# Patient Record
Sex: FEMALE | Race: WHITE | NOT HISPANIC OR LATINO | URBAN - METROPOLITAN AREA
[De-identification: names, ages, dates, MRNs, and addresses within clinical notes are randomized per-mention and may not be internally consistent; named-entity substitution may affect disease eponyms.]

---

## 2018-02-19 ENCOUNTER — APPOINTMENT (RX ONLY)
Dept: URBAN - METROPOLITAN AREA CLINIC 24 | Facility: CLINIC | Age: 53
Setting detail: DERMATOLOGY
End: 2018-02-19

## 2018-02-19 DIAGNOSIS — Z41.9 ENCOUNTER FOR PROCEDURE FOR PURPOSES OTHER THAN REMEDYING HEALTH STATE, UNSPECIFIED: ICD-10-CM

## 2018-02-19 PROCEDURE — ? GENTLELASE

## 2018-02-19 ASSESSMENT — LOCATION ZONE DERM
LOCATION ZONE: LIP
LOCATION ZONE: ARM
LOCATION ZONE: FACE
LOCATION ZONE: TRUNK

## 2018-02-19 ASSESSMENT — LOCATION DETAILED DESCRIPTION DERM
LOCATION DETAILED: RIGHT ANTERIOR SHOULDER
LOCATION DETAILED: LEFT CHIN
LOCATION DETAILED: LEFT SUPERIOR VERMILION LIP
LOCATION DETAILED: LEFT LATERAL SUPERIOR CHEST

## 2018-02-19 ASSESSMENT — LOCATION SIMPLE DESCRIPTION DERM
LOCATION SIMPLE: CHEST
LOCATION SIMPLE: CHIN
LOCATION SIMPLE: LEFT LIP
LOCATION SIMPLE: RIGHT SHOULDER

## 2018-02-19 NOTE — PROCEDURE: GENTLELASE
Spot Size: 18 mm
Pulse Duration: 3 ms
Cooling: DCD 50/50
Indication: Laser Hair Removal
Consent: Written consent obtained, risks reviewed including but not limited to crusting, scabbing, blistering, scarring, darker or lighter pigmentary change, paradoxical hair regrowth, incomplete removal of hair and infection.
Endpoint: Lentigines: Immediate endpoint: perilesional erythema and edema. Vaseline and ice applied. Post care reviewed with patient.
Endpoint: Laser Hair Removal: Immediate endpoint: perifollicular erythema and edema. Vaseline and ice applied. Post care reviewed with patient.
Post-Care Instructions: I reviewed with the patient in detail post-care instructions. Patient should avoid sun for a minimum of 4 weeks before and after treatment.
External Cooling: Gera Cryo 6
Detail Level: Detailed
Fluence: 10
Laser Type: Alexandrite 755nm
Price (Use Numbers Only, No Special Characters Or $): 25.00
External Cooling Fan Speed: 1
Total Pulses: 128

## 2018-05-14 ENCOUNTER — APPOINTMENT (RX ONLY)
Dept: URBAN - METROPOLITAN AREA CLINIC 24 | Facility: CLINIC | Age: 53
Setting detail: DERMATOLOGY
End: 2018-05-14

## 2018-05-14 DIAGNOSIS — Z41.9 ENCOUNTER FOR PROCEDURE FOR PURPOSES OTHER THAN REMEDYING HEALTH STATE, UNSPECIFIED: ICD-10-CM

## 2018-05-14 PROCEDURE — ? GENTLELASE

## 2018-05-14 ASSESSMENT — LOCATION DETAILED DESCRIPTION DERM
LOCATION DETAILED: RIGHT UPPER CUTANEOUS LIP
LOCATION DETAILED: PHILTRUM
LOCATION DETAILED: LEFT UPPER CUTANEOUS LIP

## 2018-05-14 ASSESSMENT — LOCATION ZONE DERM: LOCATION ZONE: LIP

## 2018-05-14 ASSESSMENT — LOCATION SIMPLE DESCRIPTION DERM
LOCATION SIMPLE: LEFT LIP
LOCATION SIMPLE: UPPER LIP
LOCATION SIMPLE: RIGHT LIP

## 2018-05-14 NOTE — PROCEDURE: GENTLELASE
Endpoint: Lentigines: Immediate endpoint: perilesional erythema and edema. Vaseline and ice applied. Post care reviewed with patient.
Fluence: 14
External Cooling: Gera Cryo 6
Post-Care Instructions: I reviewed with the patient in detail post-care instructions. Patient should avoid sun for a minimum of 4 weeks before and after treatment.
External Cooling Fan Speed: 1
Consent: Written consent obtained, risks reviewed including but not limited to crusting, scabbing, blistering, scarring, darker or lighter pigmentary change, paradoxical hair regrowth, incomplete removal of hair and infection.
Indication: Laser Hair Removal
Pulse Duration: 3 ms
Endpoint: Laser Hair Removal: Immediate endpoint: perifollicular erythema and edema. Vaseline and ice applied. Post care reviewed with patient.
Cooling: DCD 50/50
Detail Level: Detailed
Total Pulses: 5
Spot Size: 18 mm
Laser Type: Alexandrite 755nm

## 2019-03-14 ENCOUNTER — APPOINTMENT (RX ONLY)
Dept: URBAN - METROPOLITAN AREA CLINIC 23 | Facility: CLINIC | Age: 54
Setting detail: DERMATOLOGY
End: 2019-03-14

## 2019-03-14 DIAGNOSIS — Z41.9 ENCOUNTER FOR PROCEDURE FOR PURPOSES OTHER THAN REMEDYING HEALTH STATE, UNSPECIFIED: ICD-10-CM

## 2019-03-14 PROCEDURE — ? GENTLELASE

## 2019-03-14 ASSESSMENT — LOCATION SIMPLE DESCRIPTION DERM
LOCATION SIMPLE: LEFT POSTERIOR THIGH
LOCATION SIMPLE: RIGHT KNEE
LOCATION SIMPLE: RIGHT THIGH
LOCATION SIMPLE: RIGHT BREAST
LOCATION SIMPLE: CHEST
LOCATION SIMPLE: LEFT THIGH
LOCATION SIMPLE: RIGHT POSTERIOR THIGH
LOCATION SIMPLE: LEFT KNEE
LOCATION SIMPLE: LEFT LIP

## 2019-03-14 ASSESSMENT — LOCATION DETAILED DESCRIPTION DERM
LOCATION DETAILED: LEFT DISTAL POSTERIOR THIGH
LOCATION DETAILED: RIGHT AXILLARY TAIL OF BREAST
LOCATION DETAILED: LEFT LATERAL SUPERIOR CHEST
LOCATION DETAILED: LEFT ANTERIOR PROXIMAL THIGH
LOCATION DETAILED: LEFT KNEE
LOCATION DETAILED: LEFT UPPER CUTANEOUS LIP
LOCATION DETAILED: RIGHT KNEE
LOCATION DETAILED: RIGHT DISTAL POSTERIOR THIGH
LOCATION DETAILED: RIGHT ANTERIOR PROXIMAL THIGH
LOCATION DETAILED: LEFT LOWER CUTANEOUS LIP

## 2019-03-14 ASSESSMENT — LOCATION ZONE DERM
LOCATION ZONE: TRUNK
LOCATION ZONE: LEG
LOCATION ZONE: LIP

## 2019-03-14 NOTE — PROCEDURE: GENTLELASE
Price (Use Numbers Only, No Special Characters Or $): 25
Consent: Written consent obtained, risks reviewed including but not limited to crusting, scabbing, blistering, scarring, darker or lighter pigmentary change, paradoxical hair regrowth, incomplete removal of hair and infection.
Cooling: DCD setting
External Cooling Fan Speed: 0
Endpoint: Seborrheic Keratoses: Immediate endpoint: perilesional erythema and edema. Vaseline and ice applied. Post care reviewed with patient.
Fluence: 15
Endpoint: Laser Hair Removal: Immediate endpoint: perifollicular erythema and edema. Vaseline and ice applied. Post care reviewed with patient.
Spot Size: 18 mm
Laser Type: Alexandrite 755nm
Pulse Duration: 3 ms
Detail Level: Detailed
Indication: Laser Hair Removal
Post-Care Instructions: I reviewed with the patient in detail post-care instructions. Patient should avoid sun for a minimum of 4 weeks before and after treatment.

## 2019-05-06 ENCOUNTER — APPOINTMENT (RX ONLY)
Dept: URBAN - METROPOLITAN AREA CLINIC 23 | Facility: CLINIC | Age: 54
Setting detail: DERMATOLOGY
End: 2019-05-06

## 2019-05-06 DIAGNOSIS — Z41.9 ENCOUNTER FOR PROCEDURE FOR PURPOSES OTHER THAN REMEDYING HEALTH STATE, UNSPECIFIED: ICD-10-CM

## 2019-05-06 PROCEDURE — ? GENTLEMAX

## 2019-05-06 PROCEDURE — ? GENTLELASE

## 2019-05-06 ASSESSMENT — LOCATION SIMPLE DESCRIPTION DERM
LOCATION SIMPLE: CHIN
LOCATION SIMPLE: RIGHT HAND
LOCATION SIMPLE: LEFT HAND

## 2019-05-06 ASSESSMENT — LOCATION DETAILED DESCRIPTION DERM
LOCATION DETAILED: RIGHT CHIN
LOCATION DETAILED: LEFT RADIAL DORSAL HAND
LOCATION DETAILED: RIGHT ULNAR DORSAL HAND

## 2019-05-06 ASSESSMENT — LOCATION ZONE DERM
LOCATION ZONE: FACE
LOCATION ZONE: HAND

## 2019-05-06 NOTE — PROCEDURE: GENTLEMAX
Cooling: DCD 40/30
Pulse Duration: 1 ms
External Cooling Fan Speed: 0
Total Pulses: 150
Consent: Written consent obtained, risks reviewed including but not limited to crusting, scabbing, blistering, scarring, darker or lighter pigmentary change, paradoxical hair regrowth, incomplete removal of hair and infection.
Fluence: 14
Endpoint: Immediate endpoint: perifollicular erythema and edema. Vaseline and ice applied. Post care reviewed with patient.\\n\\nPatient was too tan to IPL lower arms and chest so ONLY DID HANDS. Did test spots on chest and still looks too tan.
Post-Care Instructions: I reviewed with the patient in detail post-care instructions. Patient should avoid sun for a minimum of 4 weeks before and after treatment.
Detail Level: Detailed
Spot Size: 15 mm

## 2019-05-06 NOTE — PROCEDURE: GENTLELASE
Cooling: DCD setting
Endpoint: Seborrheic Keratoses: Immediate endpoint: perilesional erythema and edema. Vaseline and ice applied. Post care reviewed with patient.
Detail Level: Detailed
Fluence: 12
Indication: Laser Hair Removal
Price (Use Numbers Only, No Special Characters Or $): 25
Consent: Written consent obtained, risks reviewed including but not limited to crusting, scabbing, blistering, scarring, darker or lighter pigmentary change, paradoxical hair regrowth, incomplete removal of hair and infection.
Post-Care Instructions: I reviewed with the patient in detail post-care instructions. Patient should avoid sun for a minimum of 4 weeks before and after treatment.
Endpoint: Laser Hair Removal: Immediate endpoint: perifollicular erythema and edema. Vaseline and ice applied. Post care reviewed with patient.\\n\\n1 more upper leg & 3 more Bikinix,4 forehead\\nT/U on Lower leg, chin/lip, Ax $75 T/U
Spot Size: 18 mm
External Cooling Fan Speed: 0
Pulse Duration: 3 ms
Laser Type: Alexandrite 755nm

## 2020-02-14 ENCOUNTER — APPOINTMENT (RX ONLY)
Dept: URBAN - METROPOLITAN AREA CLINIC 23 | Facility: CLINIC | Age: 55
Setting detail: DERMATOLOGY
End: 2020-02-14

## 2020-02-14 DIAGNOSIS — Z41.9 ENCOUNTER FOR PROCEDURE FOR PURPOSES OTHER THAN REMEDYING HEALTH STATE, UNSPECIFIED: ICD-10-CM

## 2020-02-14 PROCEDURE — ? GENTLELASE

## 2020-02-14 PROCEDURE — ? GENTLEMAX

## 2020-02-14 ASSESSMENT — LOCATION ZONE DERM
LOCATION ZONE: ARM
LOCATION ZONE: HAND
LOCATION ZONE: TRUNK
LOCATION ZONE: FACE
LOCATION ZONE: NECK

## 2020-02-14 ASSESSMENT — LOCATION DETAILED DESCRIPTION DERM
LOCATION DETAILED: RIGHT AXILLARY TAIL OF BREAST
LOCATION DETAILED: RIGHT SUPERIOR ANTERIOR NECK
LOCATION DETAILED: RIGHT INFERIOR CENTRAL MALAR CHEEK
LOCATION DETAILED: RIGHT CENTRAL BUCCAL CHEEK
LOCATION DETAILED: LEFT SUPERIOR ANTERIOR NECK
LOCATION DETAILED: LEFT SUPERIOR LATERAL BUCCAL CHEEK
LOCATION DETAILED: RIGHT ULNAR DORSAL HAND
LOCATION DETAILED: LEFT RADIAL DORSAL HAND
LOCATION DETAILED: RIGHT CHIN
LOCATION DETAILED: RIGHT INFERIOR LATERAL MALAR CHEEK
LOCATION DETAILED: LEFT INFERIOR CENTRAL MALAR CHEEK
LOCATION DETAILED: LEFT INFERIOR MEDIAL BUCCAL CHEEK
LOCATION DETAILED: LEFT ANTERIOR MEDIAL PROXIMAL UPPER ARM

## 2020-02-14 ASSESSMENT — LOCATION SIMPLE DESCRIPTION DERM
LOCATION SIMPLE: CHIN
LOCATION SIMPLE: RIGHT ANTERIOR NECK
LOCATION SIMPLE: LEFT CHEEK
LOCATION SIMPLE: LEFT HAND
LOCATION SIMPLE: RIGHT HAND
LOCATION SIMPLE: RIGHT BREAST
LOCATION SIMPLE: LEFT ANTERIOR NECK
LOCATION SIMPLE: LEFT UPPER ARM
LOCATION SIMPLE: RIGHT CHEEK

## 2020-02-14 NOTE — PROCEDURE: GENTLELASE
Endpoint: Seborrheic Keratoses: Immediate endpoint: perilesional erythema and edema. Vaseline and ice applied. Post care reviewed with patient.
Spot Size: 18 mm
Consent: Written consent obtained, risks reviewed including but not limited to crusting, scabbing, blistering, scarring, darker or lighter pigmentary change, paradoxical hair regrowth, incomplete removal of hair and infection.
Price (Use Numbers Only, No Special Characters Or $): 25
Endpoint: Laser Hair Removal: Immediate endpoint: perifollicular erythema and edema. Vaseline and ice applied. Post care reviewed with patient.\\n\\n
External Cooling Fan Speed: 0
Cooling: DCD setting
Total Pulses: 500
Indication: Laser Hair Removal
Laser Type: Alexandrite 755nm
Pulse Duration: 5 ms
Fluence: 16
Detail Level: Detailed
Post-Care Instructions: I reviewed with the patient in detail post-care instructions. Patient should avoid sun for a minimum of 4 weeks before and after treatment.

## 2020-02-14 NOTE — PROCEDURE: GENTLEMAX
Spot Size: 18 mm
Post-Care Instructions: I reviewed with the patient in detail post-care instructions. Patient should avoid sun for a minimum of 4 weeks before and after treatment.
Detail Level: Detailed
Price (Use Numbers Only, No Special Characters Or $): 25
External Cooling Fan Speed: 0
Endpoint: Immediate endpoint: perifollicular erythema and edema. Vaseline and ice applied. Post care reviewed with patient\\n\\nbrowns
Pulse Duration: 5 ms
Consent: Written consent obtained, risks reviewed including but not limited to crusting, scabbing, blistering, scarring, darker or lighter pigmentary change, paradoxical hair regrowth, incomplete removal of hair and infection.
Fluence: 16
Cooling: DCD 0

## 2020-10-13 ENCOUNTER — APPOINTMENT (RX ONLY)
Dept: URBAN - METROPOLITAN AREA CLINIC 23 | Facility: CLINIC | Age: 55
Setting detail: DERMATOLOGY
End: 2020-10-13

## 2020-10-13 DIAGNOSIS — Z41.9 ENCOUNTER FOR PROCEDURE FOR PURPOSES OTHER THAN REMEDYING HEALTH STATE, UNSPECIFIED: ICD-10-CM

## 2020-10-13 PROCEDURE — ? GENTLELASE

## 2020-10-13 ASSESSMENT — LOCATION ZONE DERM
LOCATION ZONE: LIP
LOCATION ZONE: ARM
LOCATION ZONE: FACE

## 2020-10-13 ASSESSMENT — LOCATION DETAILED DESCRIPTION DERM
LOCATION DETAILED: LEFT CHIN
LOCATION DETAILED: LEFT ANTERIOR MEDIAL PROXIMAL UPPER ARM
LOCATION DETAILED: RIGHT CHIN
LOCATION DETAILED: LEFT INFERIOR LATERAL MALAR CHEEK
LOCATION DETAILED: RIGHT LATERAL BUCCAL CHEEK
LOCATION DETAILED: LEFT UPPER CUTANEOUS LIP
LOCATION DETAILED: RIGHT UPPER CUTANEOUS LIP
LOCATION DETAILED: LEFT LATERAL BUCCAL CHEEK

## 2020-10-13 ASSESSMENT — LOCATION SIMPLE DESCRIPTION DERM
LOCATION SIMPLE: CHIN
LOCATION SIMPLE: RIGHT CHEEK
LOCATION SIMPLE: LEFT LIP
LOCATION SIMPLE: RIGHT LIP
LOCATION SIMPLE: LEFT UPPER ARM
LOCATION SIMPLE: LEFT CHEEK

## 2020-10-13 NOTE — HPI: COSMETIC (LASER HAIR REMOVAL)
Additional History: t/u browns and vessels next time\\ngave samples of anteage to try!\\aki told her about sepcial

## 2020-10-13 NOTE — PROCEDURE: GENTLELASE
Post-Care Instructions: I reviewed with the patient in detail post-care instructions. Patient should avoid sun for a minimum of 4 weeks before and after treatment.
Total Pulses: 15
Endpoint: Seborrheic Keratoses: Immediate endpoint: perilesional erythema and edema. Vaseline and ice applied. Post care reviewed with patient.
Endpoint: Laser Hair Removal: Immediate endpoint: perifollicular erythema and edema. Vaseline and ice applied. Post care reviewed with patient.\\n\\n
Spot Size: 18 mm
External Cooling Fan Speed: 0
Consent: Written consent obtained, risks reviewed including but not limited to crusting, scabbing, blistering, scarring, darker or lighter pigmentary change, paradoxical hair regrowth, incomplete removal of hair and infection.
Pulse Duration: 5 ms
Detail Level: Detailed
Cooling: DCD 30/20
Fluence: 14
Indication: Laser Hair Removal
Price (Use Numbers Only, No Special Characters Or $): 25
Laser Type: Alexandrite 755nm

## 2020-12-08 ENCOUNTER — APPOINTMENT (RX ONLY)
Dept: URBAN - METROPOLITAN AREA CLINIC 23 | Facility: CLINIC | Age: 55
Setting detail: DERMATOLOGY
End: 2020-12-08

## 2021-05-06 ENCOUNTER — APPOINTMENT (RX ONLY)
Dept: URBAN - METROPOLITAN AREA CLINIC 23 | Facility: CLINIC | Age: 56
Setting detail: DERMATOLOGY
End: 2021-05-06

## 2021-05-06 DIAGNOSIS — Z41.9 ENCOUNTER FOR PROCEDURE FOR PURPOSES OTHER THAN REMEDYING HEALTH STATE, UNSPECIFIED: ICD-10-CM

## 2021-05-06 PROCEDURE — ? GENTLELASE

## 2021-05-06 ASSESSMENT — LOCATION ZONE DERM
LOCATION ZONE: LIP
LOCATION ZONE: ARM
LOCATION ZONE: LEG
LOCATION ZONE: FACE

## 2021-05-06 ASSESSMENT — LOCATION SIMPLE DESCRIPTION DERM
LOCATION SIMPLE: RIGHT UPPER ARM
LOCATION SIMPLE: RIGHT THIGH
LOCATION SIMPLE: LEFT UPPER ARM
LOCATION SIMPLE: CHIN
LOCATION SIMPLE: LEFT THIGH
LOCATION SIMPLE: RIGHT LIP
LOCATION SIMPLE: LEFT LIP

## 2021-05-06 ASSESSMENT — LOCATION DETAILED DESCRIPTION DERM
LOCATION DETAILED: RIGHT ANTERIOR MEDIAL PROXIMAL UPPER ARM
LOCATION DETAILED: LEFT UPPER CUTANEOUS LIP
LOCATION DETAILED: RIGHT ANTERIOR PROXIMAL THIGH
LOCATION DETAILED: LEFT CHIN
LOCATION DETAILED: LEFT ANTERIOR MEDIAL PROXIMAL UPPER ARM
LOCATION DETAILED: LEFT LOWER CUTANEOUS LIP
LOCATION DETAILED: RIGHT UPPER CUTANEOUS LIP
LOCATION DETAILED: LEFT ANTERIOR PROXIMAL THIGH
LOCATION DETAILED: RIGHT LOWER CUTANEOUS LIP

## 2021-05-06 NOTE — HPI: COSMETIC (LASER HAIR REMOVAL)
Additional History: T/U chin\\n1 hair on underarms\\ncouple hairs on bikin i\\n$25\\nWants to treat browns on hands and chest $75 or do new IPL for $150/300\\nComing back in in fall to start dermaplaning + blue peel $250 suggested lona\\nher daughter is getting  in 6 months

## 2021-05-06 NOTE — PROCEDURE: GENTLELASE
Spot Size: 20 mm
External Cooling Fan Speed: 0
Indication: Laser Hair Removal
Laser Type: Alexandrite 755nm
Cooling: DCD 30/20
Fluence: 10
Price (Use Numbers Only, No Special Characters Or $): 25
Post-Care Instructions: I reviewed with the patient in detail post-care instructions. Patient should avoid sun for a minimum of 4 weeks before and after treatment.
Consent: Written consent obtained, risks reviewed including but not limited to crusting, scabbing, blistering, scarring, darker or lighter pigmentary change, paradoxical hair regrowth, incomplete removal of hair and infection.
Detail Level: Detailed
Pulse Duration: 5 ms
Endpoint: Laser Hair Removal: Immediate endpoint: perifollicular erythema and edema. Vaseline and ice applied. Post care reviewed with patient.\\n\\n
Endpoint: Lentigines: Immediate endpoint: perilesional erythema and edema. Vaseline and ice applied. Post care reviewed with patient.

## 2021-10-11 ENCOUNTER — APPOINTMENT (RX ONLY)
Dept: URBAN - METROPOLITAN AREA CLINIC 23 | Facility: CLINIC | Age: 56
Setting detail: DERMATOLOGY
End: 2021-10-11

## 2021-10-11 DIAGNOSIS — Z41.9 ENCOUNTER FOR PROCEDURE FOR PURPOSES OTHER THAN REMEDYING HEALTH STATE, UNSPECIFIED: ICD-10-CM

## 2021-10-11 PROCEDURE — ? GENTLELASE

## 2021-10-11 PROCEDURE — ? DERMAPLANE

## 2021-10-11 PROCEDURE — ? CHEMICAL PEEL

## 2021-10-11 ASSESSMENT — LOCATION SIMPLE DESCRIPTION DERM
LOCATION SIMPLE: CHIN
LOCATION SIMPLE: RIGHT FOREHEAD
LOCATION SIMPLE: LEFT CHEEK
LOCATION SIMPLE: RIGHT CHEEK
LOCATION SIMPLE: UPPER LIP
LOCATION SIMPLE: RIGHT ANTERIOR NECK
LOCATION SIMPLE: LEFT BREAST
LOCATION SIMPLE: LEFT THIGH
LOCATION SIMPLE: LEFT ANTERIOR NECK
LOCATION SIMPLE: RIGHT THIGH

## 2021-10-11 ASSESSMENT — LOCATION ZONE DERM
LOCATION ZONE: LEG
LOCATION ZONE: NECK
LOCATION ZONE: FACE
LOCATION ZONE: TRUNK
LOCATION ZONE: LIP

## 2021-10-11 ASSESSMENT — LOCATION DETAILED DESCRIPTION DERM
LOCATION DETAILED: RIGHT INFERIOR MEDIAL MALAR CHEEK
LOCATION DETAILED: RIGHT SUPERIOR ANTERIOR NECK
LOCATION DETAILED: LEFT SUPERIOR ANTERIOR NECK
LOCATION DETAILED: LEFT CHIN
LOCATION DETAILED: RIGHT MEDIAL FOREHEAD
LOCATION DETAILED: PHILTRUM
LOCATION DETAILED: LEFT CENTRAL MALAR CHEEK
LOCATION DETAILED: LEFT CENTRAL BUCCAL CHEEK
LOCATION DETAILED: LEFT AXILLARY TAIL OF BREAST
LOCATION DETAILED: LEFT ANTERIOR PROXIMAL THIGH
LOCATION DETAILED: RIGHT ANTERIOR PROXIMAL THIGH

## 2021-10-11 NOTE — PROCEDURE: GENTLELASE
Spot Size: 20 mm
Fluence: 10
Endpoint: Laser Hair Removal: Immediate endpoint: perifollicular erythema and edema. Vaseline and ice applied. Post care reviewed with patient.\\n\\n
Cooling: DCD 30/20
Consent: Written consent obtained, risks reviewed including but not limited to crusting, scabbing, blistering, scarring, darker or lighter pigmentary change, paradoxical hair regrowth, incomplete removal of hair and infection.
Indication: Laser Hair Removal
Post-Care Instructions: I reviewed with the patient in detail post-care instructions. Patient should avoid sun for a minimum of 4 weeks before and after treatment.
Pulse Duration: 3 ms
Laser Type: Alexandrite 755nm
Endpoint: Lentigines: Immediate endpoint: perilesional erythema and edema. Vaseline and ice applied. Post care reviewed with patient.
Detail Level: Detailed
External Cooling Fan Speed: 0

## 2021-10-11 NOTE — HPI: COSMETIC (LASER HAIR REMOVAL)
Additional History: Did dermaplaning and blue peel\\nrecommended eery 3-4 months\\ncomginb back for Neck and Face IPL Poiliderkma\\nwedding in feb. 20th\\ngettig 2 in\\n350 per tx

## 2021-10-11 NOTE — PROCEDURE: CHEMICAL PEEL
Treatment Time (Optional): 15 min, 1 passes
Post-Care Instructions: I reviewed with the patient in detail post-care instructions. Patient should avoid sun exposure and wear sun protection.
Post Peel Care: After the procedure, a post-peel cream was applied to the treated areas. Sun protection and post-care instructions were reviewed with the patient.
Prep: The treated area was degreased with pre-peel cleanser, and vaseline was applied for protection of mucous membranes.
Number Of Layers: 1
Detail Level: Zone
Price (Use Numbers Only, No Special Characters Or $): 150
Consent: Prior to the procedure, written consent was obtained and risks were reviewed, including but not limited to: redness, peeling, blistering, pigmentary change, scarring, infection, and pain.
Treatment Number: 0
Chemical Peel: Obagi Blue Peel Radiance
Comments: 4 layers used 2 bottles for face/neck\\ndidnt charge for 2

## 2021-12-14 ENCOUNTER — APPOINTMENT (RX ONLY)
Dept: URBAN - METROPOLITAN AREA CLINIC 23 | Facility: CLINIC | Age: 56
Setting detail: DERMATOLOGY
End: 2021-12-14

## 2021-12-14 DIAGNOSIS — Z41.9 ENCOUNTER FOR PROCEDURE FOR PURPOSES OTHER THAN REMEDYING HEALTH STATE, UNSPECIFIED: ICD-10-CM

## 2021-12-14 PROCEDURE — ? LIMELIGHT IPL

## 2021-12-14 ASSESSMENT — LOCATION SIMPLE DESCRIPTION DERM
LOCATION SIMPLE: CHEST
LOCATION SIMPLE: LEFT CHEEK
LOCATION SIMPLE: CHIN
LOCATION SIMPLE: RIGHT ANTERIOR NECK
LOCATION SIMPLE: RIGHT CHEEK
LOCATION SIMPLE: RIGHT FOREHEAD

## 2021-12-14 ASSESSMENT — LOCATION DETAILED DESCRIPTION DERM
LOCATION DETAILED: RIGHT INFERIOR ANTERIOR NECK
LOCATION DETAILED: LEFT INFERIOR CENTRAL MALAR CHEEK
LOCATION DETAILED: LEFT CHIN
LOCATION DETAILED: RIGHT INFERIOR MEDIAL FOREHEAD
LOCATION DETAILED: LEFT CENTRAL MALAR CHEEK
LOCATION DETAILED: UPPER STERNUM
LOCATION DETAILED: RIGHT INFERIOR CENTRAL MALAR CHEEK

## 2021-12-14 ASSESSMENT — LOCATION ZONE DERM
LOCATION ZONE: TRUNK
LOCATION ZONE: NECK
LOCATION ZONE: FACE

## 2021-12-14 NOTE — PROCEDURE: LIMELIGHT IPL
Number Of Passes: 1
Post-Care Instructions: I reviewed with the patient in detail post-care instructions. Patient should stay away from the sun and wear sun protection until treated areas are fully healed.
Detail Level: Zone
Sun Mode: On
Program: A
Post-Procedure: Following the procedure the post care instructions were reviewed with the patient.\\n\\n
Consent: Written consent obtained, risks reviewed including but not limited to crusting, scabbing, blistering, scarring, darker or lighter pigmentary change, bruising, and/or incomplete response.
Spot Size(S): 10 x 30 mm
Coolin C
Price (Use Numbers Only, No Special Characters Or $): 292
Sun Mode: Off
Fluence: 10
Repetition Rate: 2 Hz
Treated Area: medium area
Cooling: 10 C
Fluence: 15
Program: B
External Cooling Fan Speed: 2
Repetition Rate: 0.8 Hz
Pre-Procedure: The treatment areas identified by the patient were cleansed, gel was applied and treatment was performed with the parameters mentioned above.\\nB program, 15 Face, 14 neck, 13 chest

## 2021-12-14 NOTE — HPI: COSMETIC (LASER BROWN SPOTS)
Have You Had Laser Removal Of Brown Spots Before?: has not had previous treatment
Additional History: Full Face/Neck\\nDID small brown areaa on chest \\nface/neck/chest 350\\nwants to do series\\[id number] series price for 4 treaments

## 2022-01-25 ENCOUNTER — APPOINTMENT (RX ONLY)
Dept: URBAN - METROPOLITAN AREA CLINIC 23 | Facility: CLINIC | Age: 57
Setting detail: DERMATOLOGY
End: 2022-01-25

## 2022-01-25 DIAGNOSIS — Z41.9 ENCOUNTER FOR PROCEDURE FOR PURPOSES OTHER THAN REMEDYING HEALTH STATE, UNSPECIFIED: ICD-10-CM

## 2022-01-25 PROCEDURE — ? LIMELIGHT IPL

## 2022-01-25 PROCEDURE — ? GENTLELASE

## 2022-01-25 ASSESSMENT — LOCATION SIMPLE DESCRIPTION DERM
LOCATION SIMPLE: NOSE
LOCATION SIMPLE: LEFT LIP
LOCATION SIMPLE: RIGHT CHEEK
LOCATION SIMPLE: LEFT PRETIBIAL REGION
LOCATION SIMPLE: UPPER LIP
LOCATION SIMPLE: CHEST
LOCATION SIMPLE: LEFT ANTERIOR NECK
LOCATION SIMPLE: CHIN
LOCATION SIMPLE: RIGHT PRETIBIAL REGION
LOCATION SIMPLE: RIGHT LIP
LOCATION SIMPLE: LEFT UPPER ARM
LOCATION SIMPLE: INFERIOR FOREHEAD
LOCATION SIMPLE: RIGHT ANTERIOR NECK
LOCATION SIMPLE: LEFT CHEEK

## 2022-01-25 ASSESSMENT — LOCATION DETAILED DESCRIPTION DERM
LOCATION DETAILED: RIGHT SUPERIOR ANTERIOR NECK
LOCATION DETAILED: RIGHT INFERIOR CENTRAL MALAR CHEEK
LOCATION DETAILED: RIGHT DISTAL PRETIBIAL REGION
LOCATION DETAILED: LEFT SUPERIOR ANTERIOR NECK
LOCATION DETAILED: RIGHT INFERIOR CENTRAL BUCCAL CHEEK
LOCATION DETAILED: INFERIOR MID FOREHEAD
LOCATION DETAILED: UPPER STERNUM
LOCATION DETAILED: RIGHT PROXIMAL PRETIBIAL REGION
LOCATION DETAILED: PHILTRUM
LOCATION DETAILED: LEFT INFERIOR ANTERIOR NECK
LOCATION DETAILED: RIGHT UPPER CUTANEOUS LIP
LOCATION DETAILED: LEFT INFERIOR CENTRAL MALAR CHEEK
LOCATION DETAILED: LEFT INFERIOR CENTRAL BUCCAL CHEEK
LOCATION DETAILED: LEFT UPPER CUTANEOUS LIP
LOCATION DETAILED: LEFT ANTERIOR MEDIAL PROXIMAL UPPER ARM
LOCATION DETAILED: LEFT CHIN
LOCATION DETAILED: LEFT PROXIMAL PRETIBIAL REGION
LOCATION DETAILED: NASAL DORSUM
LOCATION DETAILED: RIGHT CHIN

## 2022-01-25 ASSESSMENT — LOCATION ZONE DERM
LOCATION ZONE: ARM
LOCATION ZONE: NOSE
LOCATION ZONE: LIP
LOCATION ZONE: LEG
LOCATION ZONE: FACE
LOCATION ZONE: TRUNK
LOCATION ZONE: NECK

## 2022-01-25 NOTE — HPI: COSMETIC (LASER HAIR REMOVAL)
Additional History: t/u lip, chin, shins, 1 hair on ax\\nspot 20 12 juhls 3ms\\n\\ndid 2nd out of 4 IPL face/Neck//Some chest\\nface 16 juhls\\n14 neck chest

## 2022-01-25 NOTE — PROCEDURE: LIMELIGHT IPL
Program: B
Pre-Procedure: The treatment areas identified by the patient were cleansed, gel was applied and treatment was performed with the parameters mentioned above.\\nB 16 Face, 14 neck and some chest\\n\\n\\n\\n\\n
Fluence: 10
Number Of Passes: 1
Sun Mode: On
Sun Mode: Off
Spot Size(S): 10 x 30 mm
Post-Care Instructions: I reviewed with the patient in detail post-care instructions. Patient should stay away from the sun and wear sun protection until treated areas are fully healed.
Program: A
Price (Use Numbers Only, No Special Characters Or $): 770
Coolin C
Repetition Rate: 2 Hz
Repetition Rate: 0.8 Hz
Consent: Written consent obtained, risks reviewed including but not limited to crusting, scabbing, blistering, scarring, darker or lighter pigmentary change, bruising, and/or incomplete response.
Fluence: 16
Detail Level: Zone
Cooling: 10 C
External Cooling Fan Speed: 2
Post-Procedure: Following the procedure the post care instructions were reviewed with the patient.\\n\\n
Treated Area: medium area

## 2022-01-25 NOTE — PROCEDURE: GENTLELASE
External Cooling Fan Speed: 0
Laser Type: Alexandrite 755nm
Pulse Duration: 3 ms
Post-Care Instructions: I reviewed with the patient in detail post-care instructions. Patient should avoid sun for a minimum of 4 weeks before and after treatment.
Price (Use Numbers Only, No Special Characters Or $): 25
Consent: Written consent obtained, risks reviewed including but not limited to crusting, scabbing, blistering, scarring, darker or lighter pigmentary change, paradoxical hair regrowth, incomplete removal of hair and infection.
Cooling: DCD 30/20
Endpoint: Laser Hair Removal: Immediate endpoint: perifollicular erythema and edema. Vaseline and ice applied. Post care reviewed with patient.\\n\\n
Spot Size: 18 mm
Indication: Laser Hair Removal
Detail Level: Detailed
Fluence: 12
Endpoint: Lentigines: Immediate endpoint: perilesional erythema and edema. Vaseline and ice applied. Post care reviewed with patient.

## 2022-03-08 ENCOUNTER — APPOINTMENT (RX ONLY)
Dept: URBAN - METROPOLITAN AREA CLINIC 23 | Facility: CLINIC | Age: 57
Setting detail: DERMATOLOGY
End: 2022-03-08

## 2022-03-08 DIAGNOSIS — Z41.9 ENCOUNTER FOR PROCEDURE FOR PURPOSES OTHER THAN REMEDYING HEALTH STATE, UNSPECIFIED: ICD-10-CM

## 2022-03-08 PROCEDURE — ? GENTLELASE

## 2022-03-08 ASSESSMENT — LOCATION SIMPLE DESCRIPTION DERM
LOCATION SIMPLE: RIGHT LIP
LOCATION SIMPLE: RIGHT PRETIBIAL REGION
LOCATION SIMPLE: LEFT PRETIBIAL REGION
LOCATION SIMPLE: CHIN
LOCATION SIMPLE: LEFT BREAST
LOCATION SIMPLE: RIGHT BREAST
LOCATION SIMPLE: LEFT LIP

## 2022-03-08 ASSESSMENT — LOCATION DETAILED DESCRIPTION DERM
LOCATION DETAILED: LEFT CHIN
LOCATION DETAILED: LEFT PROXIMAL PRETIBIAL REGION
LOCATION DETAILED: LEFT AXILLARY TAIL OF BREAST
LOCATION DETAILED: LEFT UPPER CUTANEOUS LIP
LOCATION DETAILED: RIGHT PROXIMAL PRETIBIAL REGION
LOCATION DETAILED: RIGHT UPPER CUTANEOUS LIP
LOCATION DETAILED: RIGHT AXILLARY TAIL OF BREAST

## 2022-03-08 ASSESSMENT — LOCATION ZONE DERM
LOCATION ZONE: TRUNK
LOCATION ZONE: LIP
LOCATION ZONE: LEG
LOCATION ZONE: FACE

## 2022-03-08 NOTE — PROCEDURE: GENTLELASE
Price (Use Numbers Only, No Special Characters Or $): 25
External Cooling Fan Speed: 0
Indication: Laser Hair Removal
Fluence: 14
Spot Size: 18 mm
Post-Care Instructions: I reviewed with the patient in detail post-care instructions. Patient should avoid sun for a minimum of 4 weeks before and after treatment.
Endpoint: Seborrheic Keratoses: Immediate endpoint: perilesional erythema and edema. Vaseline and ice applied. Post care reviewed with patient.
Laser Type: Alexandrite 755nm
Detail Level: Detailed
Cooling: DCD 30/20
Consent: Written consent obtained, risks reviewed including but not limited to crusting, scabbing, blistering, scarring, darker or lighter pigmentary change, paradoxical hair regrowth, incomplete removal of hair and infection.
Pulse Duration: 3 ms
Endpoint: Laser Hair Removal: Immediate endpoint: perifollicular erythema and edema. Vaseline and ice applied. Post care reviewed with patient.\\n\\n

## 2022-10-25 ENCOUNTER — APPOINTMENT (RX ONLY)
Dept: URBAN - METROPOLITAN AREA CLINIC 23 | Facility: CLINIC | Age: 57
Setting detail: DERMATOLOGY
End: 2022-10-25

## 2022-10-25 DIAGNOSIS — Z41.9 ENCOUNTER FOR PROCEDURE FOR PURPOSES OTHER THAN REMEDYING HEALTH STATE, UNSPECIFIED: ICD-10-CM

## 2022-10-25 PROCEDURE — ? GENTLELASE

## 2022-10-25 ASSESSMENT — LOCATION SIMPLE DESCRIPTION DERM
LOCATION SIMPLE: CHIN
LOCATION SIMPLE: LEFT PRETIBIAL REGION
LOCATION SIMPLE: RIGHT KNEE
LOCATION SIMPLE: LEFT CALF
LOCATION SIMPLE: UPPER LIP

## 2022-10-25 ASSESSMENT — LOCATION DETAILED DESCRIPTION DERM
LOCATION DETAILED: PHILTRUM
LOCATION DETAILED: LEFT PROXIMAL CALF
LOCATION DETAILED: RIGHT CHIN
LOCATION DETAILED: LEFT DISTAL PRETIBIAL REGION
LOCATION DETAILED: RIGHT KNEE

## 2022-10-25 ASSESSMENT — LOCATION ZONE DERM
LOCATION ZONE: LEG
LOCATION ZONE: FACE
LOCATION ZONE: LIP

## 2022-10-25 NOTE — PROCEDURE: GENTLELASE
Consent: Written consent obtained, risks reviewed including but not limited to crusting, scabbing, blistering, scarring, darker or lighter pigmentary change, paradoxical hair regrowth, incomplete removal of hair and infection.
Price (Use Numbers Only, No Special Characters Or $): 73
Laser Type: Alexandrite 755nm
Cooling: DCD setting
Post-Care Instructions: I reviewed with the patient in detail post-care instructions. Patient should avoid sun for a minimum of 4 weeks before and after treatment.
Endpoint: Seborrheic Keratoses: Immediate endpoint: perilesional erythema and edema. Vaseline and ice applied. Post care reviewed with patient.
Fluence: 14
External Cooling Fan Speed: 0
Indication: Laser Hair Removal
Endpoint: Laser Hair Removal: Immediate endpoint: perifollicular erythema and edema. Vaseline and ice applied. Post care reviewed with patient.
Pulse Duration: 3 ms
Spot Size: 18 mm
Detail Level: Detailed

## 2022-10-25 NOTE — HPI: COSMETIC (LASER HAIR REMOVAL)
Additional History: No hair on underarms at all\\nhardly any one face\\nmost on shin and knes but not bad\\n\\ndid 18 , 14 juhls 3ms on all areas\\ndoing her 34rd and 4th face/neck/chest IPL in Dec

## 2022-12-20 ENCOUNTER — APPOINTMENT (RX ONLY)
Dept: URBAN - METROPOLITAN AREA CLINIC 24 | Facility: CLINIC | Age: 57
Setting detail: DERMATOLOGY
End: 2022-12-20

## 2022-12-20 DIAGNOSIS — Z41.9 ENCOUNTER FOR PROCEDURE FOR PURPOSES OTHER THAN REMEDYING HEALTH STATE, UNSPECIFIED: ICD-10-CM

## 2022-12-20 PROCEDURE — ? IPL CUTERA XEO

## 2022-12-20 PROCEDURE — ? INVENTORY

## 2022-12-20 ASSESSMENT — LOCATION DETAILED DESCRIPTION DERM
LOCATION DETAILED: LEFT CHIN
LOCATION DETAILED: LEFT CENTRAL MALAR CHEEK
LOCATION DETAILED: RIGHT CENTRAL MALAR CHEEK
LOCATION DETAILED: LEFT SUPERIOR ANTERIOR NECK
LOCATION DETAILED: NASAL SUPRATIP
LOCATION DETAILED: LEFT INFERIOR MEDIAL FOREHEAD

## 2022-12-20 ASSESSMENT — LOCATION SIMPLE DESCRIPTION DERM
LOCATION SIMPLE: NOSE
LOCATION SIMPLE: RIGHT CHEEK
LOCATION SIMPLE: LEFT CHEEK
LOCATION SIMPLE: LEFT FOREHEAD
LOCATION SIMPLE: CHIN
LOCATION SIMPLE: LEFT ANTERIOR NECK

## 2022-12-20 ASSESSMENT — LOCATION ZONE DERM
LOCATION ZONE: FACE
LOCATION ZONE: NECK
LOCATION ZONE: NOSE

## 2022-12-20 NOTE — PROCEDURE: IPL CUTERA XEO
Repetition Rate: 0.8 Hz
Treatment Number: 1
Post-Procedure: Full Face IPL 18 juhslB \\n2 layers on face but not forehead\\nneck 1 layer B 16 juhls poikliderma\\nfew spots on chest inlcuded B 17 juhls\\nhas one more left\\nbought mor molly perez and liza balderas
Anesthesia Type: 1% lidocaine with epinephrine
Consent: Written consent obtained, risks reviewed including but not limited to crusting, scabbing, blistering, scarring, darker or lighter pigmentary change, bruising, and/or incomplete response.
Pre-Procedure: The treatment areas identified by the patient were cleansed, gel was applied and treatment was performed with the parameters mentioned above.\\n\\nFull Face B 15 juhls full face 1 layer\\nA 15 on nos, inner hceeks and chin
Detail Level: Zone
Fluence: 18
Post-Care Instructions: I reviewed with the patient in detail post-care instructions. Patient should stay away from the sun and wear sun protection until treated areas are fully healed.

## 2023-04-04 ENCOUNTER — APPOINTMENT (RX ONLY)
Dept: URBAN - METROPOLITAN AREA CLINIC 24 | Facility: CLINIC | Age: 58
Setting detail: DERMATOLOGY
End: 2023-04-04

## 2023-04-04 DIAGNOSIS — Z41.9 ENCOUNTER FOR PROCEDURE FOR PURPOSES OTHER THAN REMEDYING HEALTH STATE, UNSPECIFIED: ICD-10-CM

## 2023-04-04 PROCEDURE — ? INVENTORY

## 2023-04-04 PROCEDURE — ? IPL CUTERA XEO

## 2023-04-04 ASSESSMENT — LOCATION ZONE DERM
LOCATION ZONE: FACE
LOCATION ZONE: NOSE

## 2023-04-04 ASSESSMENT — LOCATION SIMPLE DESCRIPTION DERM
LOCATION SIMPLE: RIGHT CHEEK
LOCATION SIMPLE: NOSE
LOCATION SIMPLE: CHIN
LOCATION SIMPLE: LEFT CHEEK
LOCATION SIMPLE: RIGHT FOREHEAD

## 2023-04-04 ASSESSMENT — LOCATION DETAILED DESCRIPTION DERM
LOCATION DETAILED: RIGHT INFERIOR MEDIAL MALAR CHEEK
LOCATION DETAILED: RIGHT CHIN
LOCATION DETAILED: LEFT INFERIOR CENTRAL MALAR CHEEK
LOCATION DETAILED: NASAL SUPRATIP
LOCATION DETAILED: RIGHT INFERIOR MEDIAL FOREHEAD

## 2023-04-04 NOTE — PROCEDURE: IPL CUTERA XEO
Post-Procedure: coming back to do t/u hr\\ndo after pics that day then plan maintence 2x a year
Post-Care Instructions: I reviewed with the patient in detail post-care instructions. Patient should stay away from the sun and wear sun protection until treated areas are fully healed.
Detail Level: Zone
Fluence: 17
Repetition Rate: 0.8 Hz
Number Of Passes: 2
Treatment Number: 4
Consent: Written consent obtained, risks reviewed including but not limited to crusting, scabbing, blistering, scarring, darker or lighter pigmentary change, bruising, and/or incomplete response.
Pre-Procedure: 4th Full Face IPL\\ndid neck comp due to poiliderma\\nhit couple spots on chest yohan 12, 22 juhls\\nFull Face B 17 juhls 2 layers\\ndid spot tx with 3 mm 160 new yag on left side veinbut otherwsie needs sabas

## 2023-05-16 ENCOUNTER — APPOINTMENT (RX ONLY)
Dept: URBAN - METROPOLITAN AREA CLINIC 24 | Facility: CLINIC | Age: 58
Setting detail: DERMATOLOGY
End: 2023-05-16

## 2023-05-16 DIAGNOSIS — Z41.9 ENCOUNTER FOR PROCEDURE FOR PURPOSES OTHER THAN REMEDYING HEALTH STATE, UNSPECIFIED: ICD-10-CM

## 2023-05-16 PROCEDURE — ? GENTLELASE

## 2023-05-16 PROCEDURE — ? INVENTORY

## 2023-05-16 ASSESSMENT — LOCATION SIMPLE DESCRIPTION DERM
LOCATION SIMPLE: RIGHT THIGH
LOCATION SIMPLE: RIGHT CHEEK
LOCATION SIMPLE: LEFT PRETIBIAL REGION
LOCATION SIMPLE: LEFT POSTERIOR THIGH
LOCATION SIMPLE: LEFT LIP
LOCATION SIMPLE: RIGHT KNEE
LOCATION SIMPLE: RIGHT CALF

## 2023-05-16 ASSESSMENT — LOCATION DETAILED DESCRIPTION DERM
LOCATION DETAILED: LEFT PROXIMAL PRETIBIAL REGION
LOCATION DETAILED: LEFT UPPER CUTANEOUS LIP
LOCATION DETAILED: RIGHT DISTAL CALF
LOCATION DETAILED: RIGHT KNEE
LOCATION DETAILED: RIGHT SUPERIOR MEDIAL BUCCAL CHEEK
LOCATION DETAILED: LEFT DISTAL POSTERIOR THIGH
LOCATION DETAILED: RIGHT ANTERIOR PROXIMAL THIGH

## 2023-05-16 ASSESSMENT — LOCATION ZONE DERM
LOCATION ZONE: LIP
LOCATION ZONE: LEG
LOCATION ZONE: FACE

## 2023-05-16 NOTE — PROCEDURE: GENTLELASE
Endpoint: Seborrheic Keratoses: Immediate endpoint: perilesional erythema and edema. Vaseline and ice applied. Post care reviewed with patient.
Detail Level: Detailed
External Cooling Fan Speed: 0
Post-Care Instructions: I reviewed with the patient in detail post-care instructions. Patient should avoid sun for a minimum of 4 weeks before and after treatment.
Indication: Laser Hair Removal
Laser Type: Alexandrite 755nm
Pulse Duration: 3 ms
Cooling: DCD setting
Endpoint: Laser Hair Removal: Immediate endpoint: perifollicular erythema and edema. Vaseline and ice applied. Post care reviewed with patient.
Spot Size: 12 mm
Consent: Written consent obtained, risks reviewed including but not limited to crusting, scabbing, blistering, scarring, darker or lighter pigmentary change, paradoxical hair regrowth, incomplete removal of hair and infection.
Fluence: 24

## 2023-12-27 NOTE — PROCEDURE: DERMAPLANE
Post-Procedure Instructions: Following the dermaplane procedure, Oxymist treatment was applied to the treatment areas. Moisturizer and SPF was applied.
Treatment Area Prep: isopropyl alcohol
Price (Use Numbers Only, No Special Characters Or $): 50
Pre-Procedure Text: The patient was placed in a recumbant position on the procedure table.
Post-Care Instructions: I reviewed with the patient in detail post-care instructions.
Detail Level: Zone
Treatment Areas: face and neck
Prenatal Vitamins
Blade: 14 blade scalpel

## 2024-07-10 ENCOUNTER — APPOINTMENT (RX ONLY)
Dept: URBAN - METROPOLITAN AREA CLINIC 23 | Facility: CLINIC | Age: 59
Setting detail: DERMATOLOGY
End: 2024-07-10

## 2024-07-10 DIAGNOSIS — L82.1 OTHER SEBORRHEIC KERATOSIS: ICD-10-CM

## 2024-07-10 DIAGNOSIS — D22 MELANOCYTIC NEVI: ICD-10-CM

## 2024-07-10 DIAGNOSIS — L57.8 OTHER SKIN CHANGES DUE TO CHRONIC EXPOSURE TO NONIONIZING RADIATION: ICD-10-CM

## 2024-07-10 PROBLEM — D22.5 MELANOCYTIC NEVI OF TRUNK: Status: ACTIVE | Noted: 2024-07-10

## 2024-07-10 PROCEDURE — 99203 OFFICE O/P NEW LOW 30 MIN: CPT

## 2024-07-10 PROCEDURE — ? COUNSELING

## 2024-07-10 ASSESSMENT — LOCATION SIMPLE DESCRIPTION DERM
LOCATION SIMPLE: LEFT FOREARM
LOCATION SIMPLE: RIGHT UPPER BACK
LOCATION SIMPLE: LEFT THIGH
LOCATION SIMPLE: ABDOMEN
LOCATION SIMPLE: RIGHT FOREARM

## 2024-07-10 ASSESSMENT — LOCATION DETAILED DESCRIPTION DERM
LOCATION DETAILED: RIGHT INFERIOR UPPER BACK
LOCATION DETAILED: LEFT LATERAL ABDOMEN
LOCATION DETAILED: RIGHT DISTAL RADIAL DORSAL FOREARM
LOCATION DETAILED: LEFT ANTERIOR PROXIMAL THIGH
LOCATION DETAILED: LEFT DISTAL DORSAL FOREARM
LOCATION DETAILED: RIGHT SUPERIOR MEDIAL UPPER BACK

## 2024-07-10 ASSESSMENT — LOCATION ZONE DERM
LOCATION ZONE: ARM
LOCATION ZONE: TRUNK
LOCATION ZONE: LEG